# Patient Record
Sex: FEMALE | Race: WHITE | NOT HISPANIC OR LATINO | Employment: OTHER | URBAN - METROPOLITAN AREA
[De-identification: names, ages, dates, MRNs, and addresses within clinical notes are randomized per-mention and may not be internally consistent; named-entity substitution may affect disease eponyms.]

---

## 2017-08-29 ENCOUNTER — OFFICE VISIT (OUTPATIENT)
Dept: DERMATOLOGY | Facility: CLINIC | Age: 37
End: 2017-08-29
Payer: COMMERCIAL

## 2017-08-29 DIAGNOSIS — D22.9 MULTIPLE PIGMENTED NEVI: ICD-10-CM

## 2017-08-29 DIAGNOSIS — D48.5 NEOPLASM OF UNCERTAIN BEHAVIOR OF SKIN: Primary | ICD-10-CM

## 2017-08-29 PROCEDURE — 88305 TISSUE EXAM BY PATHOLOGIST: CPT | Performed by: DERMATOLOGY

## 2017-08-29 PROCEDURE — 11100 HC BIOPSY SKIN/SUBQ/MUC MEM, SINGLE LESION: CPT | Performed by: DERMATOLOGY

## 2017-08-29 PROCEDURE — 99203 OFFICE O/P NEW LOW 30 MIN: CPT | Mod: 25 | Performed by: DERMATOLOGY

## 2017-08-29 RX ORDER — AMPICILLIN TRIHYDRATE 250 MG
CAPSULE ORAL
COMMUNITY
Start: 2012-07-24

## 2017-08-29 RX ORDER — BACLOFEN 20 MG
TABLET ORAL
COMMUNITY
Start: 2017-07-03

## 2017-08-29 RX ORDER — BUSPIRONE HYDROCHLORIDE 10 MG/1
10 TABLET ORAL
COMMUNITY
Start: 2017-07-03

## 2017-08-29 RX ORDER — CALCIUM CARB/VITAMIN D3/VIT K1 500-500-40
TABLET,CHEWABLE ORAL
COMMUNITY
Start: 2013-11-07

## 2017-08-29 RX ORDER — DIPHENOXYLATE HYDROCHLORIDE AND ATROPINE SULFATE 2.5; .025 MG/1; MG/1
TABLET ORAL
COMMUNITY
Start: 2010-07-27

## 2017-08-29 NOTE — NURSING NOTE
Chief Complaint   Patient presents with     Consult     new pt, ref by Dr. Violetta Dempsey([facility]) mole on outside of left breast-with last year some changes-halo around it, growing in size tx: none        Initial There were no vitals taken for this visit. There is no height or weight on file to calculate BMI.  Medication Reconciliation: complete   Cynthia De Leon MA

## 2017-08-29 NOTE — LETTER
8/29/2017      RE: Carina Chau  [address] [facility] [address]               DERMATOLOGY CLINIC VISIT NOTE    DPL:  1. Pigmented nevi    CHIEF COMPLAINT:  Changing mole.      HISTORY OF PRESENT ILLNESS:  Ms. Chau is a 37-year-old female presenting for initial evaluation of a changing mole on her left breast.  She is seen today at the request of Violetta Dempsey from [facility].  The patient states that the mole has been present for many years.  Over the last several months it has increased in diameter.  There has been no associated pain or irritation.  She has no history of skin cancer or dysplastic nevi.  She has no other lesions of concern today.      Patient Active Problem List   Diagnosis   (none) - all problems resolved or deleted       No Known Allergies      Current Outpatient Prescriptions   Medication     busPIRone (BUSPAR) 10 MG tablet     Chromium Picolinate 1000 MCG TABS     cinnamon 500 MG CAPS     FIBER PO     Magnesium Oxide 500 MG TABS     Multiple Vitamin (MULTI-VITAMINS) TABS     Lactobacillus (CVS PROBIOTIC ACIDOPHILUS) 10 MG CAPS     No current facility-administered medications for this visit.           SOCIAL HISTORY:  The patient is .  She has several children.      REVIEW OF SYSTEMS:  Feels well without additional skin concerns.      FAMILY HISTORY:  No family history of skin cancer.      PHYSICAL EXAMINATION:   There were no vitals taken for this visit.    GENERAL:  The patient is a healthy-appearing 37-year-old female in no distress.   HEENT:  Conjunctivae are clear.   PULMONARY:  Breathing comfortably on room air.   ABDOMEN:  No abdominal distention.   SKIN:  Examination today included the face, neck, chest, abdomen, back, arms, legs, hands, feet.  Skin exam was normal except for as follows:   -- Examination of the right posterior shoulder with approximately 6 mm medium brown macule with globular pigment network.   -- Scattered smaller 2-3 mm medium  and light brown macules with reticulated and globular pigment network.   -- Left lateral breast with an approximately 8 mm dark brown macule with lighter brown periphery with a dense globular pigment network throughout and black discoloration centrally.      ASSESSMENT AND PLAN:   1.  Benign pigmented nevi.  Discussed the ABCDEs of malignant melanoma.  Sun protection advised.     2.  Neoplasm of uncertain behavior on left breast.  Changing, growing mole.  Globular network noted by dermoscopy which may indicate increased pigmentation activity.  The area was infiltrated with 1 mL of lidocaine with epinephrine.  A DermaBlade was used to perform shave biopsy.  Aluminum chloride, petrolatum and a bandage applied.  Wound care instruction provided.      The patient to return pending biopsy results.      Thank you for this consultation.     Terra Flynn MD  Dermatology Staff       cc:   Violetta Dempsey MD   [address]  [address]         TERRA FLYNN MD             D: 2017 16:38   T: 2017 08:18   MT: mag      Name:     CHET JEAN   MRN:      [id number]        Account:      [id number]   :      1980           Service Date: 2017      Document: [id number]

## 2017-08-30 NOTE — PROGRESS NOTES
DERMATOLOGY CLINIC VISIT NOTE    DPL:  1. Pigmented nevi    CHIEF COMPLAINT:  Changing mole.      HISTORY OF PRESENT ILLNESS:  Ms. Chau is a 37-year-old female presenting for initial evaluation of a changing mole on her left breast.  She is seen today at the request of Violetta Dempsey from [facility].  The patient states that the mole has been present for many years.  Over the last several months it has increased in diameter.  There has been no associated pain or irritation.  She has no history of skin cancer or dysplastic nevi.  She has no other lesions of concern today.      Patient Active Problem List   Diagnosis   (none) - all problems resolved or deleted       No Known Allergies      Current Outpatient Prescriptions   Medication     busPIRone (BUSPAR) 10 MG tablet     Chromium Picolinate 1000 MCG TABS     cinnamon 500 MG CAPS     FIBER PO     Magnesium Oxide 500 MG TABS     Multiple Vitamin (MULTI-VITAMINS) TABS     Lactobacillus (CVS PROBIOTIC ACIDOPHILUS) 10 MG CAPS     No current facility-administered medications for this visit.           SOCIAL HISTORY:  The patient is .  She has several children.      REVIEW OF SYSTEMS:  Feels well without additional skin concerns.      FAMILY HISTORY:  No family history of skin cancer.      PHYSICAL EXAMINATION:   There were no vitals taken for this visit.    GENERAL:  The patient is a healthy-appearing 37-year-old female in no distress.   HEENT:  Conjunctivae are clear.   PULMONARY:  Breathing comfortably on room air.   ABDOMEN:  No abdominal distention.   SKIN:  Examination today included the face, neck, chest, abdomen, back, arms, legs, hands, feet.  Skin exam was normal except for as follows:   -- Examination of the right posterior shoulder with approximately 6 mm medium brown macule with globular pigment network.   -- Scattered smaller 2-3 mm medium and light brown macules with reticulated and globular pigment network.   -- Left lateral breast with  an approximately 8 mm dark brown macule with lighter brown periphery with a dense globular pigment network throughout and black discoloration centrally.      ASSESSMENT AND PLAN:   1.  Benign pigmented nevi.  Discussed the ABCDEs of malignant melanoma.  Sun protection advised.     2.  Neoplasm of uncertain behavior on left breast.  Changing, growing mole.  Globular network noted by dermoscopy which may indicate increased pigmentation activity.  The area was infiltrated with 1 mL of lidocaine with epinephrine.  A DermaBlade was used to perform shave biopsy.  Aluminum chloride, petrolatum and a bandage applied.  Wound care instruction provided.      The patient to return pending biopsy results.      Thank you for this consultation.     Terra Flynn MD  Dermatology Staff       cc:   Violetta Dempsey MD   [facility]   [id number] [place] [address]         TERRA FLYNN MD             D: 2017 16:38   T: 2017 08:18   MT: mag      Name:     CHET JEAN   MRN:      [id number]        Account:      [id number]   :      1980           Service Date: 2017      Document: [id number]

## 2017-09-02 PROBLEM — D22.9 MULTIPLE PIGMENTED NEVI: Status: ACTIVE | Noted: 2017-09-02

## 2017-09-02 PROBLEM — D48.5 NEOPLASM OF UNCERTAIN BEHAVIOR OF SKIN: Status: ACTIVE | Noted: 2017-09-02

## 2017-09-02 RX ORDER — LIDOCAINE HYDROCHLORIDE AND EPINEPHRINE 10; 10 MG/ML; UG/ML
3 INJECTION, SOLUTION INFILTRATION; PERINEURAL ONCE
Qty: 3 ML | Refills: 0 | OUTPATIENT
Start: 2017-09-02 | End: 2017-09-02

## 2017-09-11 LAB — COPATH REPORT: NORMAL

## 2017-09-13 ENCOUNTER — TELEPHONE (OUTPATIENT)
Dept: DERMATOLOGY | Facility: CLINIC | Age: 37
End: 2017-09-13

## 2017-09-13 NOTE — TELEPHONE ENCOUNTER
Pt requesting us to fax her pathology result ro Dr.Julie Monroe([facility]) at [phone].     Please fax result. Thanks.     Notes Recorded by Myriam Flynn MD on 9/13/2017 at 3:01 PM  I called and spoke with patient about results. She will need further excision. She would prefer a female MD. I suggested either Dr. RODO Castorena at [facility] or Dr. Kamala Monroe with [facility]. Patient will talk with her insurance to determine coverage and then call back to let me know where to send the referral.    Jie, RN  Triage Nurse

## 2017-09-14 NOTE — TELEPHONE ENCOUNTER
Faxed Pathology results to Dr. Kamala Monroe ([facility]) at [phone]. Cynthia De Leon MA

## 2017-09-16 ENCOUNTER — HEALTH MAINTENANCE LETTER (OUTPATIENT)
Age: 37
End: 2017-09-16

## 2018-09-11 ENCOUNTER — OFFICE VISIT (OUTPATIENT)
Dept: DERMATOLOGY | Facility: CLINIC | Age: 38
End: 2018-09-11
Payer: COMMERCIAL

## 2018-09-11 DIAGNOSIS — D22.9 MULTIPLE PIGMENTED NEVI: ICD-10-CM

## 2018-09-11 DIAGNOSIS — L60.3 NAIL DYSTROPHY: Primary | ICD-10-CM

## 2018-09-11 DIAGNOSIS — Z86.018 HISTORY OF DYSPLASTIC NEVUS: ICD-10-CM

## 2018-09-11 PROCEDURE — 88312 SPECIAL STAINS GROUP 1: CPT | Performed by: DERMATOLOGY

## 2018-09-11 PROCEDURE — 99214 OFFICE O/P EST MOD 30 MIN: CPT | Performed by: DERMATOLOGY

## 2018-09-11 PROCEDURE — 88304 TISSUE EXAM BY PATHOLOGIST: CPT | Mod: TC | Performed by: DERMATOLOGY

## 2018-09-11 NOTE — LETTER
9/11/2018      RE: Carina Chau  [address]  [facility] [address]       Dermatology Clinic Visit Note      Service Date: 09/11/2018      CHIEF COMPLAINT:  Skin check.      DERMATOLOGY PROBLEM LIST:   1.  Benign pigmented nevi.   2.  History of moderate to severely dysplastic nevus on left breast excised by Dr. Monroe in 09/2017.   3.  Nail dystrophy.      HISTORY OF PRESENT ILLNESS:  Ms. Chau is a 38-year-old female returning to Dermatology Clinic for ongoing skin check.  She has a past history of a moderate to severely dysplastic nevus involving the left breast that was biopsied during her last clinic visit on 08/29/2017.  She subsequently had excision of the lesion by Dr. Monroe.  She notes that the area is slightly pink, but continues to heal.  There has been no additional pigmentation.  She notes a mole on her mons pubis today.  She is unsure if this has changed over time.  She has no other skin concerns today.      Patient Active Problem List   Diagnosis     Neoplasm of uncertain behavior of skin     Multiple pigmented nevi       No Known Allergies      Current Outpatient Prescriptions   Medication     busPIRone (BUSPAR) 10 MG tablet     Chromium Picolinate 1000 MCG TABS     cinnamon 500 MG CAPS     FIBER PO     Lactobacillus (CVS PROBIOTIC ACIDOPHILUS) 10 MG CAPS     Magnesium Oxide 500 MG TABS     Multiple Vitamin (MULTI-VITAMINS) TABS     No current facility-administered medications for this visit.           SOCIAL HISTORY:  The patient is  with several children.  They spent time at a horse camp this summer.      FAMILY HISTORY:  No family history of cancer.      REVIEW OF SYSTEMS:  Feels well without other skin concerns.      PHYSICAL EXAMINATION:   GENERAL:  The patient is a healthy-appearing, 38-year-old female in no distress.   HEENT:  Conjunctivae are clear.   PULMONARY:  Breathing comfortably on room air.   ABDOMEN:  No abdominal distention.   SKIN:  Exam today includes the  scalp, face, neck, chest, abdomen, back, arms, legs, hands, feet, buttocks and genital area.  Skin exam is normal except for as follows:   - Right upper back with an approximately 8 mm, medium-brown macule with lighter brown at the periphery.   - Linear scar over the left breast with mild pink coloration with no repigmentation.   - Examination of the right mons pubis shows a 4 mm, light-brown macule with a net-like pigment network.   - On the right 3rd lateral dorsal toe approximating the proximal nail fold, there is a 4 mm x 4 mm, medium-brown macule.   - Thickening of the right 3rd nail plate with subungual debris.   - Scattered, cherry-red papules on the chest, abdomen and back.   - Scattered, 2-3 mm, medium-brown macules on the arms, legs and abdomen.      ASSESSMENT AND PLAN:   1.  History of moderate to severely dysplastic nevus on left breast:  No concern for recurrence today.   2.  Cherry angiomas:  Benign vascular papules.  No treatment advised.   3.  Nail dystrophy involving the right 3rd toenail plate.  Nail clipping obtained today to evaluate for dermatophyte infection.   4.  Benign pigmented nevi:  Monitor clinically a lesion on the mons pubis and on the right 3rd toe.  No concerning features on exam today.      The patient to return in 1 year's time, sooner with concerns.      Terra Flynn MD  Dermatology Staff       TERRA FLYNN MD             D: 2018   T: 2018   MT: kesha      Name:     CHET JEAN   MRN:      [id number]        Account:      [id number]   :      1980           Service Date: 2018      Document: [id number]        Terra Flynn MD

## 2018-09-11 NOTE — LETTER
Date:September 14, 2018      Patient was self referred, no letter generated. Do not send.        [facility] Information

## 2018-09-11 NOTE — PROGRESS NOTES
Dermatology Clinic Visit Note      Service Date: 09/11/2018      CHIEF COMPLAINT:  Skin check.      DERMATOLOGY PROBLEM LIST:   1.  Benign pigmented nevi.   2.  History of moderate to severely dysplastic nevus on left breast excised by Dr. Monroe in 09/2017.   3.  Nail dystrophy.      HISTORY OF PRESENT ILLNESS:  Ms. Chau is a 38-year-old female returning to Dermatology Clinic for ongoing skin check.  She has a past history of a moderate to severely dysplastic nevus involving the left breast that was biopsied during her last clinic visit on 08/29/2017.  She subsequently had excision of the lesion by Dr. Monroe.  She notes that the area is slightly pink, but continues to heal.  There has been no additional pigmentation.  She notes a mole on her mons pubis today.  She is unsure if this has changed over time.  She has no other skin concerns today.      Patient Active Problem List   Diagnosis     Neoplasm of uncertain behavior of skin     Multiple pigmented nevi       No Known Allergies      Current Outpatient Prescriptions   Medication     busPIRone (BUSPAR) 10 MG tablet     Chromium Picolinate 1000 MCG TABS     cinnamon 500 MG CAPS     FIBER PO     Lactobacillus (CVS PROBIOTIC ACIDOPHILUS) 10 MG CAPS     Magnesium Oxide 500 MG TABS     Multiple Vitamin (MULTI-VITAMINS) TABS     No current facility-administered medications for this visit.           SOCIAL HISTORY:  The patient is  with several children.  They spent time at a horse camp this summer.      FAMILY HISTORY:  No family history of cancer.      REVIEW OF SYSTEMS:  Feels well without other skin concerns.      PHYSICAL EXAMINATION:   GENERAL:  The patient is a healthy-appearing, 38-year-old female in no distress.   HEENT:  Conjunctivae are clear.   PULMONARY:  Breathing comfortably on room air.   ABDOMEN:  No abdominal distention.   SKIN:  Exam today includes the scalp, face, neck, chest, abdomen, back, arms, legs, hands, feet, buttocks and genital  area.  Skin exam is normal except for as follows:   - Right upper back with an approximately 8 mm, medium-brown macule with lighter brown at the periphery.   - Linear scar over the left breast with mild pink coloration with no repigmentation.   - Examination of the right mons pubis shows a 4 mm, light-brown macule with a net-like pigment network.   - On the right 3rd lateral dorsal toe approximating the proximal nail fold, there is a 4 mm x 4 mm, medium-brown macule.   - Thickening of the right 3rd nail plate with subungual debris.   - Scattered, cherry-red papules on the chest, abdomen and back.   - Scattered, 2-3 mm, medium-brown macules on the arms, legs and abdomen.      ASSESSMENT AND PLAN:   1.  History of moderate to severely dysplastic nevus on left breast:  No concern for recurrence today.   2.  Cherry angiomas:  Benign vascular papules.  No treatment advised.   3.  Nail dystrophy involving the right 3rd toenail plate.  Nail clipping obtained today to evaluate for dermatophyte infection.   4.  Benign pigmented nevi:  Monitor clinically a lesion on the mons pubis and on the right 3rd toe.  No concerning features on exam today.      The patient to return in 1 year's time, sooner with concerns.      Terra Flynn MD  Dermatology Staff       TERRA FLYNN MD             D: 2018   T: 2018   MT: kesha      Name:     CHET JEAN   MRN:      [id number]        Account:      [id number]   :      1980           Service Date: 2018      Document: [id number]

## 2018-09-13 PROBLEM — Z86.018 HISTORY OF DYSPLASTIC NEVUS: Status: ACTIVE | Noted: 2018-09-13

## 2018-09-17 LAB — COPATH REPORT: NORMAL

## 2018-09-18 ENCOUNTER — MYC MEDICAL ADVICE (OUTPATIENT)
Dept: DERMATOLOGY | Facility: CLINIC | Age: 38
End: 2018-09-18

## 2018-09-18 DIAGNOSIS — B35.1 ONYCHOMYCOSIS: Primary | ICD-10-CM

## 2018-09-18 RX ORDER — TERBINAFINE HYDROCHLORIDE 250 MG/1
250 TABLET ORAL DAILY
Qty: 30 TABLET | Refills: 2 | Status: SHIPPED | OUTPATIENT
Start: 2018-09-18 | End: 2018-12-17

## 2019-09-17 ENCOUNTER — OFFICE VISIT (OUTPATIENT)
Dept: DERMATOLOGY | Facility: CLINIC | Age: 39
End: 2019-09-17
Payer: COMMERCIAL

## 2019-09-17 DIAGNOSIS — Z86.018 HISTORY OF DYSPLASTIC NEVUS: ICD-10-CM

## 2019-09-17 DIAGNOSIS — D22.9 MULTIPLE PIGMENTED NEVI: ICD-10-CM

## 2019-09-17 DIAGNOSIS — B35.1 ONYCHOMYCOSIS: Primary | ICD-10-CM

## 2019-09-17 PROCEDURE — 87101 SKIN FUNGI CULTURE: CPT | Performed by: DERMATOLOGY

## 2019-09-17 PROCEDURE — 87107 FUNGI IDENTIFICATION MOLD: CPT | Performed by: DERMATOLOGY

## 2019-09-17 PROCEDURE — 99214 OFFICE O/P EST MOD 30 MIN: CPT | Performed by: DERMATOLOGY

## 2019-09-17 NOTE — LETTER
9/17/2019      RE: Carina Chau  [address]  [facility] [address]               Dermatology Clinic Visit Note      September 17, 2019       CHIEF COMPLAINT:  Skin check.      DERMATOLOGY PROBLEM LIST:   1.  Benign pigmented nevi.   2.  History of moderate to severely dysplastic nevus on left breast excised by Dr. Monroe in 09/2017.   3.  Nail dystrophy. +PAS, but no response to oral terbinafine x3 months.      HISTORY OF PRESENT ILLNESS:  Ms. Chau is a 39-year-old female returning to Dermatology Clinic for ongoing skin check.  She has a past history of a moderate to severely dysplastic nevus involving the left breast. At last visit a nail clipping was performed that showed hyphae. She was treated with terbinafine x3 months, but the nail did not improve. No other changing spots today. No painful or tender areas.     Patient Active Problem List   Diagnosis     Neoplasm of uncertain behavior of skin     Multiple pigmented nevi     History of dysplastic nevus       Allergies   Allergen Reactions     Liquid Adhesive Itching and Rash     Other reaction(s): Contact Dermatitis         Current Outpatient Medications   Medication     busPIRone (BUSPAR) 10 MG tablet     Chromium Picolinate 1000 MCG TABS     cinnamon 500 MG CAPS     FIBER PO     L-LYSINE PO     Lactobacillus (CVS PROBIOTIC ACIDOPHILUS) 10 MG CAPS     Magnesium Oxide 500 MG TABS     Multiple Vitamin (MULTI-VITAMINS) TABS     No current facility-administered medications for this visit.           SOCIAL HISTORY:  The patient is  with several children.     FAMILY HISTORY:  No family history of cancer.      REVIEW OF SYSTEMS:  Feels well without other skin concerns.      PHYSICAL EXAMINATION:   GENERAL:  The patient is a healthy-appearing, 38-year-old female in no distress.   HEENT:  Conjunctivae are clear.   PULMONARY:  Breathing comfortably on room air.   ABDOMEN:  No abdominal distention.   SKIN:  Exam today includes the scalp, face,  neck, chest, abdomen, back, arms, legs, hands, feet, buttocks and genital area.  Skin exam is normal except for as follows:   - Scalp is clear  - Right upper back with an approximately 8 mm, medium-brown macule with lighter brown at the periphery.   - Linear scar over the left breast with mild pink coloration with no repigmentation.   - Examination of the right mons pubis shows a 4 mm, light-brown macule with a net-like pigment network.   - On the right 3rd lateral dorsal toe approximating the proximal nail fold, there is a 4 mm x 4 mm, medium-brown macule.   - Thickening of the right 3rd nail plate with subungual debris.   - Scattered, cherry-red papules on the chest, abdomen and back.   - Scattered, 2-3 mm, medium-brown macules on the arms, legs and abdomen.      ASSESSMENT AND PLAN:   1.  History of moderate to severely dysplastic nevus on left breast:  No concern for recurrence today.   2.  Cherry angiomas:  Benign vascular papules.  No treatment advised.   3.  Nail dystrophy involving the right 3rd toenail plate.  Nail clipping obtained today for fungal culture. No benefit from 3 month course of terbinafine based on positive PAS from nail clipping.    4.  Benign pigmented nevi:  Monitor clinically a lesion on the mons pubis and on the right 3rd toe.  No concerning features on exam today.      The patient to return in 1 year's time, sooner with concerns.      Myriam Flynn MD  Dermatology Staff      Myriam Flynn MD

## 2019-09-17 NOTE — LETTER
Date:September 18, 2019      Patient was self referred, no letter generated. Do not send.        [facility]icians Health Information

## 2019-09-17 NOTE — PROGRESS NOTES
Dermatology Clinic Visit Note      September 17, 2019       CHIEF COMPLAINT:  Skin check.      DERMATOLOGY PROBLEM LIST:   1.  Benign pigmented nevi.   2.  History of moderate to severely dysplastic nevus on left breast excised by Dr. Monroe in 09/2017.   3.  Nail dystrophy. +PAS, but no response to oral terbinafine x3 months.      HISTORY OF PRESENT ILLNESS:  Ms. Chau is a 39-year-old female returning to Dermatology Clinic for ongoing skin check.  She has a past history of a moderate to severely dysplastic nevus involving the left breast. At last visit a nail clipping was performed that showed hyphae. She was treated with terbinafine x3 months, but the nail did not improve. No other changing spots today. No painful or tender areas.     Patient Active Problem List   Diagnosis     Neoplasm of uncertain behavior of skin     Multiple pigmented nevi     History of dysplastic nevus       Allergies   Allergen Reactions     Liquid Adhesive Itching and Rash     Other reaction(s): Contact Dermatitis         Current Outpatient Medications   Medication     busPIRone (BUSPAR) 10 MG tablet     Chromium Picolinate 1000 MCG TABS     cinnamon 500 MG CAPS     FIBER PO     L-LYSINE PO     Lactobacillus (CVS PROBIOTIC ACIDOPHILUS) 10 MG CAPS     Magnesium Oxide 500 MG TABS     Multiple Vitamin (MULTI-VITAMINS) TABS     No current facility-administered medications for this visit.           SOCIAL HISTORY:  The patient is  with several children.     FAMILY HISTORY:  No family history of cancer.      REVIEW OF SYSTEMS:  Feels well without other skin concerns.      PHYSICAL EXAMINATION:   GENERAL:  The patient is a healthy-appearing, 38-year-old female in no distress.   HEENT:  Conjunctivae are clear.   PULMONARY:  Breathing comfortably on room air.   ABDOMEN:  No abdominal distention.   SKIN:  Exam today includes the scalp, face, neck, chest, abdomen, back, arms, legs, hands, feet, buttocks and genital area.  Skin exam  is normal except for as follows:   - Scalp is clear  - Right upper back with an approximately 8 mm, medium-brown macule with lighter brown at the periphery.   - Linear scar over the left breast with mild pink coloration with no repigmentation.   - Examination of the right mons pubis shows a 4 mm, light-brown macule with a net-like pigment network.   - On the right 3rd lateral dorsal toe approximating the proximal nail fold, there is a 4 mm x 4 mm, medium-brown macule.   - Thickening of the right 3rd nail plate with subungual debris.   - Scattered, cherry-red papules on the chest, abdomen and back.   - Scattered, 2-3 mm, medium-brown macules on the arms, legs and abdomen.      ASSESSMENT AND PLAN:   1.  History of moderate to severely dysplastic nevus on left breast:  No concern for recurrence today.   2.  Cherry angiomas:  Benign vascular papules.  No treatment advised.   3.  Nail dystrophy involving the right 3rd toenail plate.  Nail clipping obtained today for fungal culture. No benefit from 3 month course of terbinafine based on positive PAS from nail clipping.    4.  Benign pigmented nevi:  Monitor clinically a lesion on the mons pubis and on the right 3rd toe.  No concerning features on exam today.      The patient to return in 1 year's time, sooner with concerns.      Myriam Flynn MD  Dermatology Staff

## 2019-09-17 NOTE — PATIENT INSTRUCTIONS
Pediatric Dermatology  [facility]  [address]  1st [place]  [address]  Phone: [phone]    Pediatric & Adult Dermatology  [facility]  [address] [facility]   2nd Floor  [facility] [address]  [phone]                  General information: Dr. Myriam Flynn is a board-certified dermatologist with subspecialty certification in pediatric dermatology.     Scheduling and Nurse Triage: Dr. Flynn sees pediatric patients on Mondays in [place] and adult and pediatric patients on Tuesdays in [place]. The remainder of the week she practices at the [facility]. Please call the above phone numbers to schedule or to talk to a nurse.     -For scheduling at the [place] or [place] locations, or to talk to the triage nurse please call the above phone number at the clinic where you were seen.     -For medication refills, please call your pharmacy.

## 2019-10-15 LAB
BACTERIA SPEC CULT: ABNORMAL
SPECIMEN SOURCE: ABNORMAL

## 2019-10-17 ENCOUNTER — MYC MEDICAL ADVICE (OUTPATIENT)
Dept: DERMATOLOGY | Facility: CLINIC | Age: 39
End: 2019-10-17

## 2019-10-17 DIAGNOSIS — B35.1 ONYCHOMYCOSIS: Primary | ICD-10-CM

## 2019-10-21 ENCOUNTER — MYC MEDICAL ADVICE (OUTPATIENT)
Dept: DERMATOLOGY | Facility: CLINIC | Age: 39
End: 2019-10-21

## 2019-10-21 DIAGNOSIS — B35.1 ONYCHOMYCOSIS: ICD-10-CM

## 2019-11-04 ENCOUNTER — HEALTH MAINTENANCE LETTER (OUTPATIENT)
Age: 39
End: 2019-11-04

## 2020-09-29 ENCOUNTER — OFFICE VISIT (OUTPATIENT)
Dept: DERMATOLOGY | Facility: CLINIC | Age: 40
End: 2020-09-29
Payer: COMMERCIAL

## 2020-09-29 DIAGNOSIS — Z86.018 HISTORY OF DYSPLASTIC NEVUS: ICD-10-CM

## 2020-09-29 DIAGNOSIS — D22.9 MULTIPLE PIGMENTED NEVI: Primary | ICD-10-CM

## 2020-09-29 PROCEDURE — 99214 OFFICE O/P EST MOD 30 MIN: CPT | Performed by: DERMATOLOGY

## 2020-09-29 NOTE — PROGRESS NOTES
Dermatology Clinic Visit Note             CHIEF COMPLAINT:  Skin check.      DERMATOLOGY PROBLEM LIST:   1.  Benign pigmented nevi.   2.  History of moderate to severely dysplastic nevus on left breast excised by Dr. Monroe in 09/2017.   3.  Nail dystrophy. +PAS, but no response to oral terbinafine x3 months and itraconazole x2 months     HISTORY OF PRESENT ILLNESS:  Ms. Chau is a 41 y/o presenting for skin check. Continues to have nail dystrophy without improvement with itraconazole or terbinafine. Has a spot on the L shin that may be larger.     Patient Active Problem List   Diagnosis     Neoplasm of uncertain behavior of skin     Multiple pigmented nevi     History of dysplastic nevus       Allergies   Allergen Reactions     Liquid Adhesive Itching and Rash     Other reaction(s): Contact Dermatitis         Current Outpatient Medications   Medication     busPIRone (BUSPAR) 10 MG tablet     Chromium Picolinate 1000 MCG TABS     cinnamon 500 MG CAPS     L-LYSINE PO     Magnesium Oxide 500 MG TABS     Multiple Vitamin (MULTI-VITAMINS) TABS     FIBER PO     itraconazole (ONMEL) 200 MG TABS tablet     Lactobacillus (CVS PROBIOTIC ACIDOPHILUS) 10 MG CAPS     No current facility-administered medications for this visit.           SOCIAL HISTORY:  The patient is  with several children.     FAMILY HISTORY:  No family history of cancer.      REVIEW OF SYSTEMS:  Feels well without other skin concerns.      PHYSICAL EXAMINATION:   GENERAL:  The patient is a healthy-appearing female in no distress.   HEENT:  Conjunctivae are clear.   PULMONARY:  Breathing comfortably on room air.   ABDOMEN:  No abdominal distention.   SKIN:  Exam today includes the scalp, face, neck, chest, abdomen, back, arms, legs, hands, feet, buttocks and genital area.  Skin exam is normal except for as follows:   - Scalp is clear  - Right upper back with an approximately 6 mm, medium-brown macule with lighter brown at the periphery.    - Linear scar over the left breast with mild pink coloration with no repigmentation.   - Examination of the right mons pubis shows a 4 mm, light-brown macule with a net-like pigment network.   - On the right 3rd lateral dorsal toe approximating the proximal nail fold, there is a 4 mm x 4 mm, medium-brown macule.   - Thickening of the right 3rd nail plate with subungual debris.   - Scattered, cherry-red papules on the chest, abdomen and back.   - Scattered, 2-3 mm, medium-brown macules on the arms, legs and abdomen.   - L ankle 5 mm x 3.5 mm light brown macule     ASSESSMENT AND PLAN:   1.  History of moderate to severely dysplastic nevus on left breast:  No concern for recurrence today.   2.  Cherry angiomas:  Benign vascular papules.  No treatment advised.   3.  Nail dystrophy involving the right 3rd toenail plate.  Referral to podiatry. No improvement with terbinafine or itraconazole. May trial urea 20% cream nightly.   4.  Benign pigmented nevi:  Monitor clinically a lesion on the mons pubis and on the right 3rd toe.  No concerning features on exam today.      The patient to return in 1 year's time, sooner with concerns.      Myriam Flynn MD  Dermatology Staff

## 2020-09-29 NOTE — PATIENT INSTRUCTIONS
Pediatric Dermatology  [facility]  [address]  1st [place]  [address]  Phone: [phone]    Pediatric & Adult Dermatology  [facility]  [address] [facility]   2nd Floor  [facility] [address]  [phone]                  General information: Dr. Myriam Flynn is a board-certified dermatologist with subspecialty certification in pediatric dermatology.     Scheduling and Nurse Triage: Dr. Flynn sees pediatric patients on Mondays in [place] and adult and pediatric patients on Tuesdays in [place]. The remainder of the week she practices at the [facility]. Please call the above phone numbers to schedule or to talk to a nurse.     -For scheduling at the [place] or [place] locations, or to talk to the triage nurse please call the above phone number at the clinic where you were seen.     -For medication refills, please call your pharmacy.         -For the toenail, trial of over the counter urea cream approx 20% nightly to the nail  -If interested in seeing podiatry for another opinion, I would suggest Dr. Hu

## 2020-09-29 NOTE — LETTER
9/29/2020      RE: Carina Chau  [address]  [facility] [facility] [address]                   Dermatology Clinic Visit Note             CHIEF COMPLAINT:  Skin check.      DERMATOLOGY PROBLEM LIST:   1.  Benign pigmented nevi.   2.  History of moderate to severely dysplastic nevus on left breast excised by Dr. Monroe in 09/2017.   3.  Nail dystrophy. +PAS, but no response to oral terbinafine x3 months and itraconazole x2 months     HISTORY OF PRESENT ILLNESS:  Ms. Chau is a 39 y/o presenting for skin check. Continues to have nail dystrophy without improvement with itraconazole or terbinafine. Has a spot on the L shin that may be larger.     Patient Active Problem List   Diagnosis     Neoplasm of uncertain behavior of skin     Multiple pigmented nevi     History of dysplastic nevus       Allergies   Allergen Reactions     Liquid Adhesive Itching and Rash     Other reaction(s): Contact Dermatitis         Current Outpatient Medications   Medication     busPIRone (BUSPAR) 10 MG tablet     Chromium Picolinate 1000 MCG TABS     cinnamon 500 MG CAPS     L-LYSINE PO     Magnesium Oxide 500 MG TABS     Multiple Vitamin (MULTI-VITAMINS) TABS     FIBER PO     itraconazole (ONMEL) 200 MG TABS tablet     Lactobacillus (CVS PROBIOTIC ACIDOPHILUS) 10 MG CAPS     No current facility-administered medications for this visit.           SOCIAL HISTORY:  The patient is  with several children.     FAMILY HISTORY:  No family history of cancer.      REVIEW OF SYSTEMS:  Feels well without other skin concerns.      PHYSICAL EXAMINATION:   GENERAL:  The patient is a healthy-appearing female in no distress.   HEENT:  Conjunctivae are clear.   PULMONARY:  Breathing comfortably on room air.   ABDOMEN:  No abdominal distention.   SKIN:  Exam today includes the scalp, face, neck, chest, abdomen, back, arms, legs, hands, feet, buttocks and genital area.  Skin exam is normal except for as follows:   - Scalp is clear  - Right upper  back with an approximately 6 mm, medium-brown macule with lighter brown at the periphery.   - Linear scar over the left breast with mild pink coloration with no repigmentation.   - Examination of the right mons pubis shows a 4 mm, light-brown macule with a net-like pigment network.   - On the right 3rd lateral dorsal toe approximating the proximal nail fold, there is a 4 mm x 4 mm, medium-brown macule.   - Thickening of the right 3rd nail plate with subungual debris.   - Scattered, cherry-red papules on the chest, abdomen and back.   - Scattered, 2-3 mm, medium-brown macules on the arms, legs and abdomen.   - L ankle 5 mm x 3.5 mm light brown macule     ASSESSMENT AND PLAN:   1.  History of moderate to severely dysplastic nevus on left breast:  No concern for recurrence today.   2.  Cherry angiomas:  Benign vascular papules.  No treatment advised.   3.  Nail dystrophy involving the right 3rd toenail plate.  Referral to podiatry. No improvement with terbinafine or itraconazole. May trial urea 20% cream nightly.   4.  Benign pigmented nevi:  Monitor clinically a lesion on the mons pubis and on the right 3rd toe.  No concerning features on exam today.      The patient to return in 1 year's time, sooner with concerns.      Myriam Flynn MD  Dermatology Staff      Myriam Flynn MD

## 2020-09-29 NOTE — LETTER
Date:September 30, 2020      Patient was self referred, no letter generated. Do not send.        [facility] Health Information

## 2020-11-16 ENCOUNTER — HEALTH MAINTENANCE LETTER (OUTPATIENT)
Age: 40
End: 2020-11-16

## 2021-09-18 ENCOUNTER — HEALTH MAINTENANCE LETTER (OUTPATIENT)
Age: 41
End: 2021-09-18

## 2021-10-05 ENCOUNTER — OFFICE VISIT (OUTPATIENT)
Dept: DERMATOLOGY | Facility: CLINIC | Age: 41
End: 2021-10-05
Payer: COMMERCIAL

## 2021-10-05 DIAGNOSIS — L03.039 PARONYCHIA OF GREAT TOE: Primary | ICD-10-CM

## 2021-10-05 PROCEDURE — 99214 OFFICE O/P EST MOD 30 MIN: CPT | Performed by: DERMATOLOGY

## 2021-10-05 RX ORDER — MOMETASONE FUROATE 1 MG/G
OINTMENT TOPICAL
Qty: 45 G | Refills: 1 | Status: SHIPPED | OUTPATIENT
Start: 2021-10-05

## 2021-10-05 NOTE — PATIENT INSTRUCTIONS
Pediatric Dermatology  [facility]  [address]  1st [place]  [address]  Phone: [phone]    Pediatric & Adult Dermatology  [facility]  [address]   2nd Floor  [facility] [address]  [phone]                  General information: Dr. Myriam Flynn is a board-certified dermatologist with subspecialty certification in pediatric dermatology.     Scheduling and Nurse Triage: Dr. Flynn sees pediatric patients on Mondays in [place] and adult and pediatric patients on Tuesdays in [place]. The remainder of the week she practices at the [facility]. Please call the above phone numbers to schedule or to talk to a nurse.     -For scheduling at the [place] or [place] locations, or to talk to the triage nurse please call the above phone number at the clinic where you were seen.     -For medication refills, please call your pharmacy.

## 2021-10-05 NOTE — LETTER
Date:October 7, 2021      Patient was self referred, no letter generated. Do not send.        [facility] Health Information

## 2021-10-05 NOTE — LETTER
10/5/2021      RE: Carina Chau  [address]  [facility] [address]           Dermatology Clinic Visit Note       CHIEF COMPLAINT:  Skin check.      DERMATOLOGY PROBLEM LIST:   1.  Benign pigmented nevi.   2.  History of moderate to severely dysplastic nevus on left breast excised by Dr. Monroe in 09/2017.   3.  Nail dystrophy. +PAS, but no response to oral terbinafine x3 months and itraconazole x2 months     HISTORY OF PRESENT ILLNESS:  Ms. Chau is a 40 y/o presenting for skin check. She notes that her great toenail intermittently gets red and swollen at the cuticle then develops a deep ridge. She is a runner.     Patient Active Problem List   Diagnosis     Neoplasm of uncertain behavior of skin     Multiple pigmented nevi     History of dysplastic nevus       Allergies   Allergen Reactions     Liquid Adhesive Itching and Rash     Other reaction(s): Contact Dermatitis         Current Outpatient Medications   Medication     Chromium Picolinate 1000 MCG TABS     cinnamon 500 MG CAPS     FIBER PO     L-LYSINE PO     Lactobacillus (CVS PROBIOTIC ACIDOPHILUS) 10 MG CAPS     Magnesium Oxide 500 MG TABS     mometasone (ELOCON) 0.1 % external ointment     Multiple Vitamin (MULTI-VITAMINS) TABS     busPIRone (BUSPAR) 10 MG tablet     itraconazole (ONMEL) 200 MG TABS tablet     No current facility-administered medications for this visit.          SOCIAL HISTORY:  The patient is  with several children.     FAMILY HISTORY:  No family history of cancer.      REVIEW OF SYSTEMS:  Feels well without other skin concerns.      PHYSICAL EXAMINATION:   GENERAL:  The patient is a healthy-appearing female in no distress.   HEENT:  Conjunctivae are clear.   PULMONARY:  Breathing comfortably on room air.   ABDOMEN:  No abdominal distention.   SKIN:  Exam today includes the scalp, face, neck, chest, abdomen, back, arms, legs, hands, feet, buttocks and genital area.  Skin exam is normal except for as follows:   -  Scalp is clear  - Right upper back with an approximately 6 mm, medium-brown macule with lighter brown at the periphery.   - Linear scar over the left breast with mild pink coloration with no repigmentation.   - Examination of the right mons pubis shows a 4 mm, light-brown macule with a net-like pigment network.   - On the right 3rd lateral dorsal toe approximating the proximal nail fold, there is a 4 mm x 4 mm, medium-brown macule.   - Thickening of the right 3rd nail plate with subungual debris.   - Scattered, cherry-red papules on the chest, abdomen and back.   - Scattered, 2-3 mm, medium-brown macules on the arms, legs and abdomen.   - L ankle 5 mm x 3.5 mm light brown macule  - Deep transverse ridging of the R great toenail plate.      ASSESSMENT AND PLAN:   1.  History of moderate to severely dysplastic nevus on left breast:  No concern for recurrence today.   2.  Cherry angiomas:  Benign vascular papules.  No treatment advised.   3.  Nail dystrophy involving the right 3rd toenail plate. Negative fungal culture.  No improvement with terbinafine or itraconazole. May trial urea 20% cream nightly.   4.  Benign pigmented nevi:  Monitor clinically a lesion on the mons pubis and on the right 3rd toe.  No concerning features on exam today.   5. Onychomedesis of the R great toenail. Intermittent. Likely secondary to irritation from running. Pt reports history of inflammation of the proximal nail fold which is likely the inciting event. Trial of mometasone ointment BID if the nail fold becomes inflammed.      The patient to return in 1 year's time, sooner with concerns.      Myriam Flynn MD  Dermatology Staff        Myriam Flynn MD

## 2021-10-06 NOTE — PROGRESS NOTES
Dermatology Clinic Visit Note       CHIEF COMPLAINT:  Skin check.      DERMATOLOGY PROBLEM LIST:   1.  Benign pigmented nevi.   2.  History of moderate to severely dysplastic nevus on left breast excised by Dr. Monroe in 09/2017.   3.  Nail dystrophy. +PAS, but no response to oral terbinafine x3 months and itraconazole x2 months     HISTORY OF PRESENT ILLNESS:  Ms. Chau is a 40 y/o presenting for skin check. She notes that her great toenail intermittently gets red and swollen at the cuticle then develops a deep ridge. She is a runner.     Patient Active Problem List   Diagnosis     Neoplasm of uncertain behavior of skin     Multiple pigmented nevi     History of dysplastic nevus       Allergies   Allergen Reactions     Liquid Adhesive Itching and Rash     Other reaction(s): Contact Dermatitis         Current Outpatient Medications   Medication     Chromium Picolinate 1000 MCG TABS     cinnamon 500 MG CAPS     FIBER PO     L-LYSINE PO     Lactobacillus (CVS PROBIOTIC ACIDOPHILUS) 10 MG CAPS     Magnesium Oxide 500 MG TABS     mometasone (ELOCON) 0.1 % external ointment     Multiple Vitamin (MULTI-VITAMINS) TABS     busPIRone (BUSPAR) 10 MG tablet     itraconazole (ONMEL) 200 MG TABS tablet     No current facility-administered medications for this visit.          SOCIAL HISTORY:  The patient is  with several children.     FAMILY HISTORY:  No family history of cancer.      REVIEW OF SYSTEMS:  Feels well without other skin concerns.      PHYSICAL EXAMINATION:   GENERAL:  The patient is a healthy-appearing female in no distress.   HEENT:  Conjunctivae are clear.   PULMONARY:  Breathing comfortably on room air.   ABDOMEN:  No abdominal distention.   SKIN:  Exam today includes the scalp, face, neck, chest, abdomen, back, arms, legs, hands, feet, buttocks and genital area.  Skin exam is normal except for as follows:   - Scalp is clear  - Right upper back with an approximately 6 mm, medium-brown macule with  lighter brown at the periphery.   - Linear scar over the left breast with mild pink coloration with no repigmentation.   - Examination of the right mons pubis shows a 4 mm, light-brown macule with a net-like pigment network.   - On the right 3rd lateral dorsal toe approximating the proximal nail fold, there is a 4 mm x 4 mm, medium-brown macule.   - Thickening of the right 3rd nail plate with subungual debris.   - Scattered, cherry-red papules on the chest, abdomen and back.   - Scattered, 2-3 mm, medium-brown macules on the arms, legs and abdomen.   - L ankle 5 mm x 3.5 mm light brown macule  - Deep transverse ridging of the R great toenail plate.      ASSESSMENT AND PLAN:   1.  History of moderate to severely dysplastic nevus on left breast:  No concern for recurrence today.   2.  Cherry angiomas:  Benign vascular papules.  No treatment advised.   3.  Nail dystrophy involving the right 3rd toenail plate. Negative fungal culture.  No improvement with terbinafine or itraconazole. May trial urea 20% cream nightly.   4.  Benign pigmented nevi:  Monitor clinically a lesion on the mons pubis and on the right 3rd toe.  No concerning features on exam today.   5. Onychomedesis of the R great toenail. Intermittent. Likely secondary to irritation from running. Pt reports history of inflammation of the proximal nail fold which is likely the inciting event. Trial of mometasone ointment BID if the nail fold becomes inflammed.      The patient to return in 1 year's time, sooner with concerns.      Myriam Flynn MD  Dermatology Staff

## 2022-01-08 ENCOUNTER — HEALTH MAINTENANCE LETTER (OUTPATIENT)
Age: 42
End: 2022-01-08

## 2022-11-01 ENCOUNTER — OFFICE VISIT (OUTPATIENT)
Dept: DERMATOLOGY | Facility: CLINIC | Age: 42
End: 2022-11-01
Payer: COMMERCIAL

## 2022-11-01 DIAGNOSIS — D22.9 MULTIPLE PIGMENTED NEVI: ICD-10-CM

## 2022-11-01 DIAGNOSIS — L81.4 LENTIGO: ICD-10-CM

## 2022-11-01 DIAGNOSIS — Z86.018 HISTORY OF DYSPLASTIC NEVUS: Primary | ICD-10-CM

## 2022-11-01 DIAGNOSIS — L60.3 NAIL DYSTROPHY: ICD-10-CM

## 2022-11-01 PROCEDURE — 99214 OFFICE O/P EST MOD 30 MIN: CPT | Performed by: DERMATOLOGY

## 2022-11-01 NOTE — LETTER
Date:November 1, 2022      Patient was self referred, no letter generated. Do not send.        [facility] Health Information

## 2022-11-01 NOTE — PROGRESS NOTES
Dermatology Clinic Visit Note       CHIEF COMPLAINT:  Skin check.      DERMATOLOGY PROBLEM LIST:   1.  Benign pigmented nevi.   2.  History of moderate to severely dysplastic nevus on left breast excised by Dr. Monroe in 09/2017.   3.  Nail dystrophy. +PAS, but no response to oral terbinafine x3 months and itraconazole x2 months  4. Labial lentigo  5. Brother with Niharika Bundy     HISTORY OF PRESENT ILLNESS:  Ms. Chau is a 41 y/o presenting for skin check. She notes that her R 3rd toenail has improved. Brother recently diagnosis with Niharika Bundy, but not sure if genetic testing is planned. No history of this in parents, (atopic dermatitis in mom). Patient also reports a new mole on the lip.     Patient Active Problem List   Diagnosis     Neoplasm of uncertain behavior of skin     Multiple pigmented nevi     History of dysplastic nevus       Allergies   Allergen Reactions     Liquid Adhesive Itching and Rash     Other reaction(s): Contact Dermatitis         Current Outpatient Medications   Medication     busPIRone (BUSPAR) 10 MG tablet     Chromium Picolinate 1000 MCG TABS     cinnamon 500 MG CAPS     FIBER PO     itraconazole (ONMEL) 200 MG TABS tablet     L-LYSINE PO     Lactobacillus (CVS PROBIOTIC ACIDOPHILUS) 10 MG CAPS     Magnesium Oxide 500 MG TABS     mometasone (ELOCON) 0.1 % external ointment     Multiple Vitamin (MULTI-VITAMINS) TABS     No current facility-administered medications for this visit.          SOCIAL HISTORY:  The patient is  with several children.     FAMILY HISTORY:  No family history of cancer. Brother recently diagnosis with Niharika Bundy. Mom with atopic dermatitis, but no one else with Niharika Bundy.      REVIEW OF SYSTEMS:  Feels well without other skin concerns.      PHYSICAL EXAMINATION:   GENERAL:  The patient is a healthy-appearing female in no distress.   HEENT:  Conjunctivae are clear.   PULMONARY:  Breathing comfortably on room air.   ABDOMEN:  No abdominal  distention.   SKIN:  Exam today includes the scalp, face, neck, chest, abdomen, back, arms, legs, hands, feet, buttocks and genital area.  Skin exam is normal except for as follows:   - Scalp is clear  - Right upper back with an approximately 6 mm, medium-brown macule with lighter brown at the periphery.   - Linear scar over the left breast  - Examination of the right mons pubis shows a 4 mm, light-brown macule with a net-like pigment network.   - On the right 3rd lateral dorsal toe approximating the proximal nail fold, there is a 4 mm x 4 mm, medium-brown macule.   - Right 3rd nail plate appears normal in thickness  - Scattered, cherry-red papules on the chest, abdomen and back.   - Scattered, 2-3 mm, medium-brown macules on the arms, legs and abdomen.   - L ankle 5 mm x 3.5 mm light brown macule  - Deep transverse ridging of the R great toenail plate.   - Light brown macule 8x4 mm on the L lower mucosal lip     ASSESSMENT AND PLAN:   1.  History of moderate to severely dysplastic nevus on left breast:  No concern for recurrence today.   2.  Cherry angiomas:  Benign vascular papules.  No treatment advised.   3.  Nail dystrophy involving the right 3rd toenail plate. Resolved.   4.  Benign pigmented nevi:  Monitor clinically a lesion on the mons pubis and on the right 3rd toe.  No concerning features on exam today.   5. Onychomedesis of the R great toenail. Intermittent. Likely secondary to irritation from running.  6. Labial lentigo: Photo today, recheck in 3 months if changing would biopsy.  7. Brother with Niharika Niharika disease: Patient to reach out to brother about his diagnosis and genetic testing. She will contact me if she requests a genetics referral.      The patient to return in 3 months.      Myriam Flynn MD  Dermatology Staff

## 2022-11-01 NOTE — LETTER
11/1/2022      RE: Carina Chau  [address]  [facility] [address]     Dear Colleague,    Thank you for the opportunity to participate in the care of your patient, Carina Chau, at the [facility]. Please see a copy of my visit note below.            Dermatology Clinic Visit Note       CHIEF COMPLAINT:  Skin check.      DERMATOLOGY PROBLEM LIST:   1.  Benign pigmented nevi.   2.  History of moderate to severely dysplastic nevus on left breast excised by Dr. Monroe in 09/2017.   3.  Nail dystrophy. +PAS, but no response to oral terbinafine x3 months and itraconazole x2 months  4. Labial lentigo  5. Brother with Niharika Bundy     HISTORY OF PRESENT ILLNESS:  Ms. Chau is a 43 y/o presenting for skin check. She notes that her R 3rd toenail has improved. Brother recently diagnosis with Niharika Bundy, but not sure if genetic testing is planned. No history of this in parents, (atopic dermatitis in mom). Patient also reports a new mole on the lip.     Patient Active Problem List   Diagnosis     Neoplasm of uncertain behavior of skin     Multiple pigmented nevi     History of dysplastic nevus       Allergies   Allergen Reactions     Liquid Adhesive Itching and Rash     Other reaction(s): Contact Dermatitis         Current Outpatient Medications   Medication     busPIRone (BUSPAR) 10 MG tablet     Chromium Picolinate 1000 MCG TABS     cinnamon 500 MG CAPS     FIBER PO     itraconazole (ONMEL) 200 MG TABS tablet     L-LYSINE PO     Lactobacillus ([employer] PROBIOTIC ACIDOPHILUS) 10 MG CAPS     Magnesium Oxide 500 MG TABS     mometasone (ELOCON) 0.1 % external ointment     Multiple Vitamin (MULTI-VITAMINS) TABS     No current facility-administered medications for this visit.          SOCIAL HISTORY:  The patient is  with several children.     FAMILY HISTORY:  No family history of cancer. Brother recently diagnosis with Niharika  Niharika. Mom with atopic dermatitis, but no one else with Niharika Bundy.      REVIEW OF SYSTEMS:  Feels well without other skin concerns.      PHYSICAL EXAMINATION:   GENERAL:  The patient is a healthy-appearing female in no distress.   HEENT:  Conjunctivae are clear.   PULMONARY:  Breathing comfortably on room air.   ABDOMEN:  No abdominal distention.   SKIN:  Exam today includes the scalp, face, neck, chest, abdomen, back, arms, legs, hands, feet, buttocks and genital area.  Skin exam is normal except for as follows:   - Scalp is clear  - Right upper back with an approximately 6 mm, medium-brown macule with lighter brown at the periphery.   - Linear scar over the left breast  - Examination of the right mons pubis shows a 4 mm, light-brown macule with a net-like pigment network.   - On the right 3rd lateral dorsal toe approximating the proximal nail fold, there is a 4 mm x 4 mm, medium-brown macule.   - Right 3rd nail plate appears normal in thickness  - Scattered, cherry-red papules on the chest, abdomen and back.   - Scattered, 2-3 mm, medium-brown macules on the arms, legs and abdomen.   - L ankle 5 mm x 3.5 mm light brown macule  - Deep transverse ridging of the R great toenail plate.   - Light brown macule 8x4 mm on the L lower mucosal lip     ASSESSMENT AND PLAN:   1.  History of moderate to severely dysplastic nevus on left breast:  No concern for recurrence today.   2.  Cherry angiomas:  Benign vascular papules.  No treatment advised.   3.  Nail dystrophy involving the right 3rd toenail plate. Resolved.   4.  Benign pigmented nevi:  Monitor clinically a lesion on the mons pubis and on the right 3rd toe.  No concerning features on exam today.   5. Onychomedesis of the R great toenail. Intermittent. Likely secondary to irritation from running.  6. Labial lentigo: Photo today, recheck in 3 months if changing would biopsy.  7. Brother with Niharika Niharika disease: Patient to reach out to brother about his  diagnosis and genetic testing. She will contact me if she requests a genetics referral.      The patient to return in 3 months.      Myriam Flynn MD  Dermatology Staff        Please do not hesitate to contact me if you have any questions/concerns.     Sincerely,       Myriam Flynn MD

## 2022-11-20 ENCOUNTER — HEALTH MAINTENANCE LETTER (OUTPATIENT)
Age: 42
End: 2022-11-20

## 2023-09-25 ENCOUNTER — VIRTUAL VISIT (OUTPATIENT)
Dept: CONSULT | Facility: CLINIC | Age: 43
End: 2023-09-25
Attending: DERMATOLOGY
Payer: COMMERCIAL

## 2023-09-25 VITALS — BODY MASS INDEX: 21.97 KG/M2 | WEIGHT: 140 LBS | HEIGHT: 67 IN

## 2023-09-25 DIAGNOSIS — Z71.83 ENCOUNTER FOR NONPROCREATIVE GENETIC COUNSELING AND TESTING: Primary | ICD-10-CM

## 2023-09-25 DIAGNOSIS — Z13.71 ENCOUNTER FOR NONPROCREATIVE GENETIC COUNSELING AND TESTING: Primary | ICD-10-CM

## 2023-09-25 DIAGNOSIS — Z84.89 FAMILY HISTORY OF GENETIC DISEASE: ICD-10-CM

## 2023-09-25 PROCEDURE — 96040 HC GENETIC COUNSELING, EACH 30 MINUTES: CPT | Mod: TEL,95 | Performed by: GENETIC COUNSELOR, MS

## 2023-09-25 ASSESSMENT — PAIN SCALES - GENERAL: PAINLEVEL: NO PAIN (0)

## 2023-09-25 NOTE — PROGRESS NOTES
Virtual Visit Details    Type of service:  Telephone Visit   Phone call duration: 49 minutes     Name:  Carina Chau  :   1980  MRN:   [id number]  Date of service: Sep 25, 2023  Referring Provider: Myriam Flynn    Genetic Counseling Consultation Note    Presenting Information:  A consultation in the [facility] was requested for Carina, a 43 year old female, for evaluation of family history of Niharika Niharika disease. This consultation was requested by Dr. Flynn in Dermatology.    Carina attended this visit conducted by phone alone.    History is obtained from Carina and the medical record. I met with the family to obtain a personal and family history, discuss possible genetic contributions to her symptoms, and to obtain informed consent for genetic testing.    Personal History:   Carina does not have a clinical diagnosis of Niharika Niharika disease. She does report symptoms that she is concerned could be early signs of the condition. These include intermittent red, flaky, itchy patches on her neck and one patch on her armpit. Carina denies any white longitudinal lines through her nails (these longitudinal leukonychia are present in 70% of patients with Niharika Niharika disease). Carina does not report other major health concerns for herself.    Patient Active Problem List   Diagnosis    Neoplasm of uncertain behavior of skin    Multiple pigmented nevi    History of dysplastic nevus     Previous Genetic Testing  Carina has never had genetic testing.    Family History:   A standard three generation pedigree was obtained and is scanned into the medical record.  History pertinent to referral is underlined.  Children:   3 sons, 1 with high-functioning autism  1 daughter, healthy  Siblings:   Full siblings:   54 y/o brother, history of Niharika-Niharika disease. Based on Carina's description, it sounds like this diagnosis was made clinically on the basis of symptoms and results obtained from a skin  biopsy. Carina does not believe that he has had genetic testing  45 y/o brother, healthy aside from cluster headaches  Paternal:   Father: 93 y/o, dementia with onset in late 70s. Possible history of red rashes, though no clinical diagnosis of Niharika Niharika disease  Paternal grandfather:  d/t heart attack in late 70s  Paternal grandmother:  d/t lung cancer in 60s, non-smoker  Paternal aunts/uncles:   7 aunts/uncles, 2 with dementia. 1  d/t cancer  possibly blood  Paternal cousins: Numerous, no health concerns known  Maternal:   Mother: 82 y/o, healthy aside from eczema  Maternal grandfather:  d/t MI in 60s  Maternal grandmother:  at 69 y/o d/t unk cause  Maternal aunts/uncles:   Numerous half aunts through father, several with blood cancers  Maternal cousins: 30+, no health concerns known    There are no additional reports of family members with Niharika Niharika disease, history suspicious for the condition, or history of genetic testing. Paternal ancestry is of [place] descent.  Maternal ancestry is of [place], [place], and [place] descent.  Consanguinity is denied.     The family history could represent a de benita, or brand new, case of Niharika Niharika disease in Carina's brother. In this case, the risk for Carina to be affected is low, <1%. Alternatively, the family history could represent the dominant inherited Niharika Niharika disease with reduced penetrance/variable expressivity in one of Carina's parents. In this case, the risk for Carina to be affected is 1/2 or 50%.     Genetic Counseling Discussion:  Niharika-Niharika disease, also known as benign chronic pemphigus, is a rare skin condition that usually appears in early adulthood. The disorder is characterized by red, raw, and blistered areas of skin that occur most often in skin folds, such as the groin, armpits, neck, and under the breasts. These inflamed areas can become crusty or scaly and may itch and burn. The skin problems tend to worsen with exposure to  "moisture (such as sweat), friction, and hot weather.    The severity of Mila-Mila disease varies from relatively mild episodes of skin irritation to widespread, persistent areas of raw and blistered skin that interfere with daily activities. Affected skin may become infected with bacteria or fungi, leading to pain and odor. Although the condition is described as \"benign\" (noncancerous), in rare cases the skin lesions may develop into a form of skin cancer called squamous cell carcinoma.    Many affected individuals also have white lines running the length of their fingernails. These lines do not cause any problems, but they can be useful for diagnosing Mila-Mila disease.  For more information, refer to MedlinePlus: https://medlineplus.gov/genetics/condition/mila-mila-disease/    For Carina, genetic testing would target the ATP2C1 gene associated with Mila Mila disease.  We discussed the details, limitations, and possible outcomes of next generation sequencing.  There are three types of results:  Negative: meaning no pathogenic variants were identified in the genes that were tested  A negative result does not rule out the possibility that Carina's symptoms are due to a genetic etiology  While a negative result would provide some reassurance, we cannot be certain that this is a true negative result since no affected family members have received genetic testing. We cannot know if Carina's results are negative because she did not inherit the cause of Mila Mila disease in her brother OR if Carina's results are negative because the cause of Mila Mila disease in her brother is not identifiable with our current genetic testing technology.  Positive: meaning one (or more) pathogenic variants were identified in the genes that were tested that are associated with Carina's symptoms  We discussed that a positive result could provide an etiology to Carina's symptoms, give anticipatory guidance as to their potential " progression, and clarify risks to family members.  We also discussed that a positive result could indicate that Carina is at risks for other health concerns, outside of their presenting symptoms  Uncertain: meaning one (or more) variants were identified in the genes that were tested, but there is not enough data to know if this variant is disease-causing; these are called variants of uncertain significance (VUS)  In most cases, identification of a VUS does not confirm a diagnosis and does not result in any clinically actionable recommendations.  The variant will be monitored over time to see if more information is known about it in the future.  If a VUS is identified, testing of other relatives may be helpful to provide clarification.  Familial variant testing for Carina's brother would be strongly recommended in this circumstance.    We discussed the potential benefits of genetic testing and why this genetic testing is medically indicated. A positive result will help clarify family history noted in Carina and could guide medical management for Carina. If a genetic cause is found for Carina, it will give us a more accurate risk assessment for other family members. Furthermore, this testing is medically relevant as individuals with Niharika Niharika disease are at higher risk for squamous cell carcinoma and additional screening may be recommended by her Dermatology team. Additionally, genotype-phenotype correlation with this gene exists, further aiding prognostic and screening protocols recommended by Carina's care team. Thus, the recommended testing for Carina is DIAGNOSTIC testing, and it is NOT investigational.    Genetic Information Nondiscrimination Act (JAUN)  We discussed the Genetic Information Nondiscrimination Act (JAUN) of 2008.  JAUN prohibits discrimination in health coverage because of genetic information. Genetic information refers to an individual's genetic tests and family medical history (including family member's  genetic tests). So, health insurers may not use genetic information to determine if someone is eligible for insurance or to make coverage, underwriting or premium-setting decisions. Therefore, it is illegal for a patient's health insurer to use family health history and genetic test results as a reason to deny health insurance or decide costs of health insurance.    JAUN also prohibits discrimination in employment (employees and applicants) because of genetic information. Genetic information refers to an individual's genetic tests and family medical history (including family member's genetic tests). This means that JAUN prevents employers from using genetic information in employment decisions such as hiring, firing, promotions, pay, and job assignments. However, the [place]  and some other professions are permitted to use genetic and medical information to make employment decisions.    There are other exceptions to JAUN..JAUN's protection applies to employers of 15 or more employees.  JAUN does not apply to life insurance, long term care insurance, and disability insurance, though some states have state laws that offer additional protections against genetic discrimination in these lines of insurance.     For more information refer to: https://www.genome.gov/about-genomics/policy-issues/Genetic-Discrimination    Plan:  1. Carina expressed verbal informed consent to proceed with genetic testing (ATP2C1 NGS) via their insurance benefits. I will mail a buccal collection kit to their home address. Carina will follow the included instructions and mail back to the laboratory.    The laboratory will conduct a benefits investigation for genetic testing. If the estimated out of pocket cost is less than $100, genetic testing will commence automatically. If the estimated out of pocket cost is greater than $100, the laboratory will reach out to Carina to discuss costs, self-payment options, financial assistance, and payment  plans. If Carina does not respond to these messages, genetic testing will not commence. Carina is aware that this is only an estimation of benefits.    2. The results of genetic testing are available ~3 weeks after the sample is received by the laboratory.  I will call Carina with the results when they are available. Results will not be left via voicemail, regardless of outcome.  3. Contact information provided.    Myriam Earl MS [facility]  Genetic Counselor  Email: [email or web address]  Phone: [phone]  Pager: [phone]    Total Time Spent in Consultation: Approximately 49 minutes    CC: No Letter    References:  Nilesh COVINGTON, Ashkan GUERRERO. Benign Familial Pemphigus (Niharika-Niharika Disease) [Updated 2023 Aug 14]. In: [employer] [Internet]. [place]): [employer]; 2023 Jan-. Available from: https://www.ncbi.nlm.nih.gov/books/TVJ229978/

## 2023-09-25 NOTE — LETTER
2023      RE: Carina Chau  [address]  [place] [address]     Dear Colleague,    Thank you for the opportunity to participate in the care of your patient, Carina Chau, at the [facility] at [facility]. Please see a copy of my visit note below.    Virtual Visit Details    Type of service:  Telephone Visit   Phone call duration: 49 minutes     Name:  Carina Chau  :   1980  MRN:   [id number]  Date of service: Sep 25, 2023  Referring Provider: Myriam Flynn    Genetic Counseling Consultation Note    Presenting Information:  A consultation in the [facility] was requested for Carina, a 43 year old female, for evaluation of family history of Niharika Niharika disease. This consultation was requested by Dr. Flynn in Dermatology.    Carina attended this visit conducted by phone alone.    History is obtained from Carina and the medical record. I met with the family to obtain a personal and family history, discuss possible genetic contributions to her symptoms, and to obtain informed consent for genetic testing.    Personal History:   Carina does not have a clinical diagnosis of Niharika Niharika disease. She does report symptoms that she is concerned could be early signs of the condition. These include intermittent red, flaky, itchy patches on her neck and one patch on her armpit. Carina denies any white longitudinal lines through her nails (these longitudinal leukonychia are present in 70% of patients with Niharika Niharika disease). Carina does not report other major health concerns for herself.    Patient Active Problem List   Diagnosis    Neoplasm of uncertain behavior of skin    Multiple pigmented nevi    History of dysplastic nevus     Previous Genetic Testing  Carina has never had genetic testing.    Family History:   A standard three generation pedigree was obtained and is scanned  into the medical record.  History pertinent to referral is underlined.  Children:   3 sons, 1 with high-functioning autism  1 daughter, healthy  Siblings:   Full siblings:   54 y/o brother, history of Niharika-Niharika disease. Based on Carina's description, it sounds like this diagnosis was made clinically on the basis of symptoms and results obtained from a skin biopsy. Carina does not believe that he has had genetic testing  45 y/o brother, healthy aside from cluster headaches  Paternal:   Father: 93 y/o, dementia with onset in late 70s. Possible history of red rashes, though no clinical diagnosis of Niharika Niharika disease  Paternal grandfather:  d/t heart attack in late 70s  Paternal grandmother:  d/t lung cancer in 60s, non-smoker  Paternal aunts/uncles:   7 aunts/uncles, 2 with dementia. 1  d/t cancer  possibly blood  Paternal cousins: Numerous, no health concerns known  Maternal:   Mother: 84 y/o, healthy aside from eczema  Maternal grandfather:  d/t MI in 60s  Maternal grandmother:  at 69 y/o d/t unk cause  Maternal aunts/uncles:   Numerous half aunts through father, several with blood cancers  Maternal cousins: 30+, no health concerns known    There are no additional reports of family members with Niharika Niharika disease, history suspicious for the condition, or history of genetic testing. Paternal ancestry is of [place] descent.  Maternal ancestry is of [place], Citizen of [place], and [place] descent.  Consanguinity is denied.     The family history could represent a de benita, or brand new, case of Niharika Niharika disease in Carina's brother. In this case, the risk for Carina to be affected is low, <1%. Alternatively, the family history could represent the dominant inherited Niharika Niharika disease with reduced penetrance/variable expressivity in one of Carina's parents. In this case, the risk for Carina to be affected is 1/2 or 50%.     Genetic Counseling Discussion:  Niharika-Niharika disease, also known as benign chronic  "pemphigus, is a rare skin condition that usually appears in early adulthood. The disorder is characterized by red, raw, and blistered areas of skin that occur most often in skin folds, such as the groin, armpits, neck, and under the breasts. These inflamed areas can become crusty or scaly and may itch and burn. The skin problems tend to worsen with exposure to moisture (such as sweat), friction, and hot weather.    The severity of Mila-Mila disease varies from relatively mild episodes of skin irritation to widespread, persistent areas of raw and blistered skin that interfere with daily activities. Affected skin may become infected with bacteria or fungi, leading to pain and odor. Although the condition is described as \"benign\" (noncancerous), in rare cases the skin lesions may develop into a form of skin cancer called squamous cell carcinoma.    Many affected individuals also have white lines running the length of their fingernails. These lines do not cause any problems, but they can be useful for diagnosing Mila-Mila disease.  For more information, refer to MedlinePlus: https://medlineplus.gov/genetics/condition/mila-mila-disease/    For Carina, genetic testing would target the ATP2C1 gene associated with Mila Mila disease.  We discussed the details, limitations, and possible outcomes of next generation sequencing.  There are three types of results:  Negative: meaning no pathogenic variants were identified in the genes that were tested  A negative result does not rule out the possibility that Carina's symptoms are due to a genetic etiology  While a negative result would provide some reassurance, we cannot be certain that this is a true negative result since no affected family members have received genetic testing. We cannot know if Carina's results are negative because she did not inherit the cause of Mila Mila disease in her brother OR if Carina's results are negative because the cause of Mila Mila " disease in her brother is not identifiable with our current genetic testing technology.  Positive: meaning one (or more) pathogenic variants were identified in the genes that were tested that are associated with Carina's symptoms  We discussed that a positive result could provide an etiology to Carina's symptoms, give anticipatory guidance as to their potential progression, and clarify risks to family members.  We also discussed that a positive result could indicate that Carina is at risks for other health concerns, outside of their presenting symptoms  Uncertain: meaning one (or more) variants were identified in the genes that were tested, but there is not enough data to know if this variant is disease-causing; these are called variants of uncertain significance (VUS)  In most cases, identification of a VUS does not confirm a diagnosis and does not result in any clinically actionable recommendations.  The variant will be monitored over time to see if more information is known about it in the future.  If a VUS is identified, testing of other relatives may be helpful to provide clarification.  Familial variant testing for Carina's brother would be strongly recommended in this circumstance.    We discussed the potential benefits of genetic testing and why this genetic testing is medically indicated. A positive result will help clarify family history noted in Carina and could guide medical management for Carina. If a genetic cause is found for Carina, it will give us a more accurate risk assessment for other family members. Furthermore, this testing is medically relevant as individuals with Niharika Niharika disease are at higher risk for squamous cell carcinoma and additional screening may be recommended by her Dermatology team. Additionally, genotype-phenotype correlation with this gene exists, further aiding prognostic and screening protocols recommended by Carina's care team. Thus, the recommended testing for Carina is DIAGNOSTIC  testing, and it is NOT investigational.    Genetic Information Nondiscrimination Act (JAUN)  We discussed the Genetic Information Nondiscrimination Act (JAUN) of 2008.  JAUN prohibits discrimination in health coverage because of genetic information. Genetic information refers to an individual's genetic tests and family medical history (including family member's genetic tests). So, health insurers may not use genetic information to determine if someone is eligible for insurance or to make coverage, underwriting or premium-setting decisions. Therefore, it is illegal for a patient's health insurer to use family health history and genetic test results as a reason to deny health insurance or decide costs of health insurance.    JAUN also prohibits discrimination in employment (employees and applicants) because of genetic information. Genetic information refers to an individual's genetic tests and family medical history (including family member's genetic tests). This means that JAUN prevents employers from using genetic information in employment decisions such as hiring, firing, promotions, pay, and job assignments. However, the [place]  and some other professions are permitted to use genetic and medical information to make employment decisions.    There are other exceptions to JAUN..JAUN's protection applies to employers of 15 or more employees.  JAUN does not apply to life insurance, long term care insurance, and disability insurance, though some states have state laws that offer additional protections against genetic discrimination in these lines of insurance.     For more information refer to: https://www.genome.gov/about-genomics/policy-issues/Genetic-Discrimination    Plan:  1. Carina expressed verbal informed consent to proceed with genetic testing (ATP2C1 NGS) via their insurance benefits. I will mail a buccal collection kit to their home address. Carina will follow the included instructions and mail back to the  laboratory.    The laboratory will conduct a benefits investigation for genetic testing. If the estimated out of pocket cost is less than $100, genetic testing will commence automatically. If the estimated out of pocket cost is greater than $100, the laboratory will reach out to Carina to discuss costs, self-payment options, financial assistance, and payment plans. If Carina does not respond to these messages, genetic testing will not commence. Carina is aware that this is only an estimation of benefits.    2. The results of genetic testing are available ~3 weeks after the sample is received by the laboratory.  I will call Carina with the results when they are available. Results will not be left via voicemail, regardless of outcome.  3. Contact information provided.    Myriam Earl MS [facility]  Genetic Counselor  Email: [email or web address]  Phone: [phone]  Pager: [phone]    Total Time Spent in Consultation: Approximately 49 minutes    CC: No Letter    References:  Ashkan Galloway MP. Benign Familial Pemphigus (Niharika-Niharika Disease) [Updated 2023 Aug 14]. In: [employer] [Internet]. [place]): [employer]; 2023 Jan-. Available from: https://www.ncbi.nlm.nih.gov/books/GTX565652/

## 2023-09-25 NOTE — NURSING NOTE
Is the patient currently in the state of [place]? YES    Visit mode:TELEPHONE    If the visit is dropped, the patient can be reconnected by: TELEPHONE VISIT: Phone number: [phone]    Will anyone else be joining the visit? NO  (If patient encounters technical issues they should call [phone]    How would you like to obtain your AVS? MyChart    Are changes needed to the allergy or medication list? No    Reason for visit: consult    Bobbilynn Grossaint VVF

## 2023-11-13 ENCOUNTER — TELEPHONE (OUTPATIENT)
Dept: CONSULT | Facility: CLINIC | Age: 43
End: 2023-11-13
Payer: COMMERCIAL

## 2023-11-13 NOTE — TELEPHONE ENCOUNTER
I called Carina to discuss the results of genetic testing. Our initial consultation occurred on 9/25/2023 where informed consent was obtained for genetic testing.    The results of ATP2C1 sequencing and deletion/duplication analysis were negative/normal. No pathogenic, likely pathogenic, or uncertain variants were detected.    Personal History:   Carina does not have a clinical diagnosis of Niharika Niharika disease.    Family History:   A standard three generation pedigree was previously obtained.  Carina's 54 y/o brother has a clinical diagnosis of Niharika Niharika disease    Assessment:  A genetic variant in the ATP2C1 gene was NOT identified. This result provides reassurance that Carina does not have Niharika Niharika disease like her brother. Unfortunately, a copy of a positive test result from Carina's brother was not available. Therefore, we cannot be certain that this result represents a true negative result. There is a small chance that genetic testing on Carina's affected brother would be negative or normal and that this result does not exclude Carina from risk. I am happy to coordinate genetic testing for Carina's brother or review test results for him if available and interpret in the context of Carina's results.    Plan  Results mailed to Carina for their records.    Myriam Earl MS [facility]  Genetic Counselor  Email: [email or web address]  Phone: [phone]  Pager: [phone]    Total Time Spent in Consultation: Approximately <5 minutes    CC: No Letter

## 2023-11-28 ENCOUNTER — OFFICE VISIT (OUTPATIENT)
Dept: DERMATOLOGY | Facility: CLINIC | Age: 43
End: 2023-11-28
Payer: COMMERCIAL

## 2023-11-28 DIAGNOSIS — Z86.018 HISTORY OF DYSPLASTIC NEVUS: Primary | ICD-10-CM

## 2023-11-28 DIAGNOSIS — D18.01 CHERRY ANGIOMA: ICD-10-CM

## 2023-11-28 DIAGNOSIS — D22.9 MULTIPLE PIGMENTED NEVI: ICD-10-CM

## 2023-11-28 DIAGNOSIS — L81.4 LENTIGO: ICD-10-CM

## 2023-11-28 PROCEDURE — 99213 OFFICE O/P EST LOW 20 MIN: CPT | Performed by: DERMATOLOGY

## 2023-11-28 NOTE — LETTER
11/28/2023      RE: Carina Chau  [address]  [place] [address]     Dear Colleague,    Thank you for the opportunity to participate in the care of your patient, Carina Chau, at the [facility] at [facility]. Please see a copy of my visit note below.            Dermatology Clinic Visit Note       CHIEF COMPLAINT:  Skin check.      DERMATOLOGY PROBLEM LIST:   1.  Benign pigmented nevi.   2.  History of moderate to severely dysplastic nevus on left breast excised by Dr. Monroe in 09/2017.   3.  Nail dystrophy. +PAS, but no response to oral terbinafine x3 months and itraconazole x2 months  4. Labial lentigo  5. Brother with Niharika Bundy- her genetic testing was negative     HISTORY OF PRESENT ILLNESS:  Ms. Chau is a 44 y/o presenting for skin check. Since last visit she had testing for Niharika Bundy which was negative. No lesions of concern. Lip lesion is stable, darker in summer.     Patient Active Problem List   Diagnosis     Neoplasm of uncertain behavior of skin     Multiple pigmented nevi     History of dysplastic nevus       Allergies   Allergen Reactions     Liquid Adhesive Itching and Rash     Other reaction(s): Contact Dermatitis         Current Outpatient Medications   Medication     cholecalciferol (VITAMIN D3) 10 mcg (400 units) TABS tablet     Chromium Picolinate 1000 MCG TABS     cinnamon 500 MG CAPS     FIBER PO     L-LYSINE PO     Lactobacillus (CVS PROBIOTIC ACIDOPHILUS) 10 MG CAPS     Magnesium Oxide 500 MG TABS     Multiple Vitamin (MULTI-VITAMINS) TABS     busPIRone (BUSPAR) 10 MG tablet     itraconazole (ONMEL) 200 MG TABS tablet     mometasone (ELOCON) 0.1 % external ointment     No current facility-administered medications for this visit.          SOCIAL HISTORY:  The patient is  with several children.     FAMILY HISTORY:  No family history of cancer. Brother recently diagnosis with Niharika Bundy.  Mom with atopic dermatitis, but no one else with Niharika Bundy.      PHYSICAL EXAMINATION:   GENERAL:  The patient is a healthy-appearing female in no distress.   SKIN:  Exam today includes the scalp, face, neck, chest, abdomen, back, arms, legs, hands, feet, buttocks and genital area.  Skin exam is normal except for as follows:   - Scalp is clear  - Right upper back with an approximately 6 mm, medium-brown macule with lighter brown at the periphery.   - Linear scar over the left breast  - Examination of the right mons pubis shows a 4 mm, light-brown macule with a net-like pigment network.   - On the right 3rd lateral dorsal toe approximating the proximal nail fold, there is a 4 mm x 4 mm, medium-brown macule.   - L dorsal 1st toe with medium brown macules  - Scattered, cherry-red papules on the chest, abdomen and back.   - Scattered, 2-3 mm, medium-brown macules on the arms, legs and abdomen.   - Firm pink papule on the R thigh with dimple sign  - L ankle 5 mm x 3.5 mm light brown macule  - Light brown macule 8x4 mm on the L lower mucosal lip     ASSESSMENT AND PLAN:   1.  History of moderate to severely dysplastic nevus on left breast:  No concern for recurrence today.   2.  Cherry angiomas:  Benign vascular papules.  No treatment advised.   3.  Benign pigmented nevi:  No concerning features on exam today.   4. Labial lentigo: Photo today, unchanged. Will continue to monitor.      The patient to return in 1 year, sooner prn.      Myriam Flynn MD  Dermatology Staff            Please do not hesitate to contact me if you have any questions/concerns.     Sincerely,       Myriam Flynn MD

## 2023-11-28 NOTE — PROGRESS NOTES
Dermatology Clinic Visit Note       CHIEF COMPLAINT:  Skin check.      DERMATOLOGY PROBLEM LIST:   1.  Benign pigmented nevi.   2.  History of moderate to severely dysplastic nevus on left breast excised by Dr. Monroe in 09/2017.   3.  Nail dystrophy. +PAS, but no response to oral terbinafine x3 months and itraconazole x2 months  4. Labial lentigo  5. Brother with Niharika Bundy- her genetic testing was negative     HISTORY OF PRESENT ILLNESS:  Ms. Chau is a 44 y/o presenting for skin check. Since last visit she had testing for Niharika Bundy which was negative. No lesions of concern. Lip lesion is stable, darker in summer.     Patient Active Problem List   Diagnosis    Neoplasm of uncertain behavior of skin    Multiple pigmented nevi    History of dysplastic nevus       Allergies   Allergen Reactions    Liquid Adhesive Itching and Rash     Other reaction(s): Contact Dermatitis         Current Outpatient Medications   Medication    cholecalciferol (VITAMIN D3) 10 mcg (400 units) TABS tablet    Chromium Picolinate 1000 MCG TABS    cinnamon 500 MG CAPS    FIBER PO    L-LYSINE PO    Lactobacillus (CVS PROBIOTIC ACIDOPHILUS) 10 MG CAPS    Magnesium Oxide 500 MG TABS    Multiple Vitamin (MULTI-VITAMINS) TABS    busPIRone (BUSPAR) 10 MG tablet    itraconazole (ONMEL) 200 MG TABS tablet    mometasone (ELOCON) 0.1 % external ointment     No current facility-administered medications for this visit.          SOCIAL HISTORY:  The patient is  with several children.     FAMILY HISTORY:  No family history of cancer. Brother recently diagnosis with Niharika Bundy. Mom with atopic dermatitis, but no one else with Niharika Bundy.      PHYSICAL EXAMINATION:   GENERAL:  The patient is a healthy-appearing female in no distress.   SKIN:  Exam today includes the scalp, face, neck, chest, abdomen, back, arms, legs, hands, feet, buttocks and genital area.  Skin exam is normal except for as follows:   - Scalp is clear  -  Right upper back with an approximately 6 mm, medium-brown macule with lighter brown at the periphery.   - Linear scar over the left breast  - Examination of the right mons pubis shows a 4 mm, light-brown macule with a net-like pigment network.   - On the right 3rd lateral dorsal toe approximating the proximal nail fold, there is a 4 mm x 4 mm, medium-brown macule.   - L dorsal 1st toe with medium brown macules  - Scattered, cherry-red papules on the chest, abdomen and back.   - Scattered, 2-3 mm, medium-brown macules on the arms, legs and abdomen.   - Firm pink papule on the R thigh with dimple sign  - L ankle 5 mm x 3.5 mm light brown macule  - Light brown macule 8x4 mm on the L lower mucosal lip     ASSESSMENT AND PLAN:   1.  History of moderate to severely dysplastic nevus on left breast:  No concern for recurrence today.   2.  Cherry angiomas:  Benign vascular papules.  No treatment advised.   3.  Benign pigmented nevi:  No concerning features on exam today.   4. Labial lentigo: Photo today, unchanged. Will continue to monitor.      The patient to return in 1 year, sooner prn.      Myriam Flynn MD  Dermatology Staff

## 2024-11-03 ENCOUNTER — HEALTH MAINTENANCE LETTER (OUTPATIENT)
Age: 44
End: 2024-11-03

## 2024-11-27 NOTE — PROGRESS NOTES
Dermatology Clinic Visit Note       CHIEF COMPLAINT:  Skin check.      DERMATOLOGY PROBLEM LIST:   1.  Benign pigmented nevi.   2.  History of moderate to severely dysplastic nevus on left breast excised by Dr. Monroe in 09/2017.   3.  Nail dystrophy. +PAS, but no response to oral terbinafine x3 months and itraconazole x2 months  4. Labial lentigo  5. Brother with Niharika Bundy- her genetic testing was negative  6. Dermatitis on the neck     HISTORY OF PRESENT ILLNESS:  Ms. Chau is a 45 y/o presenting for skin check. Notes a new brown spot by the R eyelid. Notes sometimes she gets eczema on the neck that resolves with moisturizer, urea.     Patient Active Problem List   Diagnosis    Neoplasm of uncertain behavior of skin    Multiple pigmented nevi    History of dysplastic nevus       Allergies   Allergen Reactions    Liquid Adhesive Itching and Rash     Other reaction(s): Contact Dermatitis         Current Outpatient Medications   Medication Sig Dispense Refill    busPIRone (BUSPAR) 10 MG tablet Take 10 mg by mouth      cholecalciferol (VITAMIN D3) 10 mcg (400 units) TABS tablet Take by mouth daily      Chromium Picolinate 1000 MCG TABS 1 tab daily      cinnamon 500 MG CAPS 1 cap daily      FIBER PO       itraconazole (ONMEL) 200 MG TABS tablet Take 1 tablet (200 mg) by mouth daily (Patient not taking: Reported on 9/29/2020) 90 tablet 0    L-LYSINE PO       Lactobacillus (CVS PROBIOTIC ACIDOPHILUS) 10 MG CAPS Take 1 capsule by mouth      Magnesium Oxide 500 MG TABS       mometasone (ELOCON) 0.1 % external ointment Twice daily to toenail area swelling until clear, then as needed. (Patient not taking: Reported on 9/25/2023) 45 g 1    Multiple Vitamin (MULTI-VITAMINS) TABS        No current facility-administered medications for this visit.          SOCIAL HISTORY:  The patient is  with several children.     FAMILY HISTORY:  No family history of cancer. Brother with Niharika Bundy. Mom with atopic dermatitis,  but no one else with Nihraika Bundy.      PHYSICAL EXAMINATION:   GENERAL:  The patient is a healthy-appearing female in no distress.   SKIN:  Exam today includes the scalp, face, neck, chest, abdomen, back, arms, legs, hands, feet, buttocks and genital area.  Skin exam is normal except for as follows:   - Scalp is clear  - Right upper back with an approximately 6 mm, medium-brown macule with lighter brown at the periphery.   - Linear scar over the left breast  - Examination of the right mons pubis shows a 4 mm, light-brown macule with a net-like pigment network.   - On the right 3rd lateral dorsal toe approximating the proximal nail fold, there is a 4 mm x 4 mm, medium-brown macule.   - Scattered white papules onteh face  - L dorsal 1st toe with medium brown macules  - Scattered, cherry-red papules on the chest, abdomen and back.   - Scattered, 2-3 mm, medium-brown macules on the arms, legs and abdomen.   - Firm pink papule on the R thigh with dimple sign  - L ankle 5 mm x 3.5 mm light brown macule  - Light brown macule 8x4 mm on the L lower mucosal lip  - Light brown macule on the R mid lower lid margin     ASSESSMENT AND PLAN:   1.  History of moderate to severely dysplastic nevus on left breast:  No concern for recurrence today.   2.  Cherry angiomas:  Benign vascular papules.  No treatment advised.   3.  Benign pigmented nevi:  No concerning features on exam today.   4. Labial lentigo: stable  5. Chest dermatitis. Patient declines prescription. She will reach out if bothersome  6. Lentigo on the r eyelid margin. No concerning features. Photo.   7. Milia on the face: Benign. No treatment suggested     The patient to return in 1 year, sooner prn.      Myriam Flynn MD  Dermatology Staff

## 2024-12-03 ENCOUNTER — OFFICE VISIT (OUTPATIENT)
Dept: DERMATOLOGY | Facility: CLINIC | Age: 44
End: 2024-12-03
Payer: COMMERCIAL

## 2024-12-03 DIAGNOSIS — Z86.018 HISTORY OF DYSPLASTIC NEVUS: ICD-10-CM

## 2024-12-03 DIAGNOSIS — L72.0 MILIA: ICD-10-CM

## 2024-12-03 DIAGNOSIS — L81.4 LENTIGO: Primary | ICD-10-CM

## 2024-12-03 DIAGNOSIS — D22.9 MULTIPLE PIGMENTED NEVI: ICD-10-CM

## 2024-12-03 PROCEDURE — 99213 OFFICE O/P EST LOW 20 MIN: CPT | Performed by: DERMATOLOGY

## 2024-12-03 NOTE — LETTER
12/3/2024      RE: Carina Chau  [address]  [facility] [address]     Dear Colleague,    Thank you for the opportunity to participate in the care of your patient, Carina Chau, at the [facility]. Please see a copy of my visit note below.    Dermatology Clinic Visit Note       CHIEF COMPLAINT:  Skin check.      DERMATOLOGY PROBLEM LIST:   1.  Benign pigmented nevi.   2.  History of moderate to severely dysplastic nevus on left breast excised by Dr. Monroe in 09/2017.   3.  Nail dystrophy. +PAS, but no response to oral terbinafine x3 months and itraconazole x2 months  4. Labial lentigo  5. Brother with Niharika Bundy- her genetic testing was negative  6. Dermatitis on the neck     HISTORY OF PRESENT ILLNESS:  Ms. Chau is a 43 y/o presenting for skin check. Notes a new brown spot by the R eyelid. Notes sometimes she gets eczema on the neck that resolves with moisturizer, urea.     Patient Active Problem List   Diagnosis     Neoplasm of uncertain behavior of skin     Multiple pigmented nevi     History of dysplastic nevus       Allergies   Allergen Reactions     Liquid Adhesive Itching and Rash     Other reaction(s): Contact Dermatitis         Current Outpatient Medications   Medication Sig Dispense Refill     busPIRone (BUSPAR) 10 MG tablet Take 10 mg by mouth       cholecalciferol (VITAMIN D3) 10 mcg (400 units) TABS tablet Take by mouth daily       Chromium Picolinate 1000 MCG TABS 1 tab daily       cinnamon 500 MG CAPS 1 cap daily       FIBER PO        itraconazole (ONMEL) 200 MG TABS tablet Take 1 tablet (200 mg) by mouth daily (Patient not taking: Reported on 9/29/2020) 90 tablet 0     L-LYSINE PO        Lactobacillus ([employer] PROBIOTIC ACIDOPHILUS) 10 MG CAPS Take 1 capsule by mouth       Magnesium Oxide 500 MG TABS        mometasone (ELOCON) 0.1 % external ointment Twice daily to toenail area swelling until clear,  then as needed. (Patient not taking: Reported on 9/25/2023) 45 g 1     Multiple Vitamin (MULTI-VITAMINS) TABS        No current facility-administered medications for this visit.          SOCIAL HISTORY:  The patient is  with several children.     FAMILY HISTORY:  No family history of cancer. Brother with Niharika Bundy. Mom with atopic dermatitis, but no one else with Niharika Bundy.      PHYSICAL EXAMINATION:   GENERAL:  The patient is a healthy-appearing female in no distress.   SKIN:  Exam today includes the scalp, face, neck, chest, abdomen, back, arms, legs, hands, feet, buttocks and genital area.  Skin exam is normal except for as follows:   - Scalp is clear  - Right upper back with an approximately 6 mm, medium-brown macule with lighter brown at the periphery.   - Linear scar over the left breast  - Examination of the right mons pubis shows a 4 mm, light-brown macule with a net-like pigment network.   - On the right 3rd lateral dorsal toe approximating the proximal nail fold, there is a 4 mm x 4 mm, medium-brown macule.   - Scattered white papules onteh face  - L dorsal 1st toe with medium brown macules  - Scattered, cherry-red papules on the chest, abdomen and back.   - Scattered, 2-3 mm, medium-brown macules on the arms, legs and abdomen.   - Firm pink papule on the R thigh with dimple sign  - L ankle 5 mm x 3.5 mm light brown macule  - Light brown macule 8x4 mm on the L lower mucosal lip  - Light brown macule on the R mid lower lid margin     ASSESSMENT AND PLAN:   1.  History of moderate to severely dysplastic nevus on left breast:  No concern for recurrence today.   2.  Cherry angiomas:  Benign vascular papules.  No treatment advised.   3.  Benign pigmented nevi:  No concerning features on exam today.   4. Labial lentigo: stable  5. Chest dermatitis. Patient declines prescription. She will reach out if bothersome  6. Lentigo on the r eyelid margin. No concerning features. Photo.   7. Milia on the  face: Benign. No treatment suggested     The patient to return in 1 year, sooner prn.      Myriam Flynn MD  Dermatology Staff      Please do not hesitate to contact me if you have any questions/concerns.     Sincerely,       Myriam Flynn MD